# Patient Record
(demographics unavailable — no encounter records)

---

## 2024-11-27 NOTE — HISTORY OF PRESENT ILLNESS
[FreeTextEntry1] : 67 year RH female patient with a new onset of L hand tremor starting in January 2020. Pt denies the tremor worsening since it began. Pt states she also started to notice stiffness in movement and decreased dexterity of her fingers. Pt has a Cristhian scan on July 7th 2020 which indicated decreased uptake in R and L putamen (more on R side). Pt was diagnosed by Dr. Cliff Harris with PD in 08/2020. Pt was started on Sinemet 10- 100 mg since July 2020; she states she notices a minimal improvement of hand tremor with Sinemet. Pt experiences s/e of nausea with Sinemet.   Since last visit, patient has lost her mother to lung cancer which has been extremely difficult. 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 Her anxiety and grief have increased substantially after her mother passing. Due to this her PD motor and non-motor symptoms have worsened during this time of grieving.    Motor Function:   Ambulation Steady without assistive device without dizziness or lightheadedness, denies falls, FOG, or start hesitation. Tremor more so in left hand but not bothersome Dyskinesia Denies Hand Dexterity intact and able to button shirt buttons and type using cellphone keyboard  Exercise decreased with loss of mother recently to Lung cancer  Non-Motor Function:   ADLS Independent IADLS Independent On Time typically takes 30 mins to 60 mins for pill to kick in  Off Time 3 hrs after medication  Phonation No concerns  Sialorrhea No concerns Dysphagia to S/L Presyncope related to orthostasis BM every 1-2 days Sleep very fragmented and poor given circumstances Daytime Somnolence Mood See HPI Cognition at baseline  PT/OT/SLT None at present  Medications: Stalevo 100 TID at 8 am, 1 pm, 6 pm Sinemet ER 25/100 mg QHS at 10 pm (miss it occasionally)  Azilect 1 mg QAM Xanax 0.5 mg PRN sometimes not often  Clonazepam 0.5mg QHS  Levothyroxine 75 mcg and 1 x a week 50 mcg Oxybutynin ER 10 mg Alendronate 35 mg Linzess Livalo 4 mg Pantoprazole 20 mg Vit D Vit B Fish oil

## 2024-11-27 NOTE — PHYSICAL EXAM
[FreeTextEntry1] : Patient with normal cognitive function, mild hypomimia with diminished blinking, mild reduction of voice volume but no significant dysarthria.\par  There is mild rigidity with some cogwheeling, present bilaterally, more evident on the left side.\par  Mild intermittent resting tremor is present in the left hand, and occasionally in the right.\par  There is mild loss of motor dexterity, with decrement at rapid sequential and rapid alternating movements, also slightly more evident on the left side.\par  Foot tapping is slightly impaired, also with the left side more affected.\par  Posture is mildly stooped, with some shortening of gait stride and mild asymmetry with left reduction, and decrease arm swing, more evident on the left side.No trouble standing from a seated position with arms crossed. No trouble with turns, no freezing of gait. \par  Postural reflexes are normal.

## 2024-11-27 NOTE — END OF VISIT
[] : Resident [FreeTextEntry3] : Resident present and participated to visit. History, examination, and assessment and plan discussed with resident. [Time Spent: ___ minutes] : I have spent [unfilled] minutes of time on the encounter which excludes teaching and separately reported services.

## 2024-11-27 NOTE — ASSESSMENT
[FreeTextEntry1] : 66 yo RH woman with Parkinson's disease, Cristhian confirmed, with symptoms since late 2019 - early 2020, and with prominent tremor in the left hand. She has been started on Sinemet 25/100 bid with some benefit and some nausea. Started at last visit on Stalevo 100 tid.  Suffering greatly with the loss of her mother to Lung cancer. Her PD has not progressed significantly on exam and interview today and a majority of the visit addressing psychosocial concerns and using motivational interviewing. At present will focus on helping to improve mood and sleep before further adjustments to Parkinson's meds.  Plan: - Start Mirtazapine 1/2 tablet for two weeks then 1 tablet QHS - Recommend starting Melatonin again at nighttime to help with sleep - Continue Linaclotide.  - Discussed importance of daily BM and preventing constipation for medication efficacy - Discussed the importance of walking and exercise.  Continue Medication: -Okay to continue to take Xanax 0.5mg as needed -Sinemet 25/100 CR at 10- 11 pm -Stalevo 100 TID -Azilect 1 mg QAM -F/U with Dr. Ramos in 6 months in [place]   Encouraged to increase exercise and physical activity and maintain an active social and intellectual life  More than 50% of the visit were dedicated to review of treatment, therapeutic plan, and prognosis, and patient was counseled on coping and physical and emotional wellbeing.  
[FreeTextEntry1] : 68 yo RH woman with Parkinson's disease, Cristhian confirmed, with symptoms since late 2019 - early 2020, and with prominent tremor in the left hand. She has been started on Sinemet 25/100 bid with some benefit and some nausea. Started at last visit on Stalevo 100 tid.  Suffering greatly with the loss of her mother to Lung cancer. Her PD has not progressed significantly on exam and interview today and a majority of the visit addressing psychosocial concerns and using motivational interviewing. At present will focus on helping to improve mood and sleep before further adjustments to Parkinson's meds.  Plan: - Start Mirtazapine 1/2 tablet for two weeks then 1 tablet QHS - Recommend starting Melatonin again at nighttime to help with sleep - Continue Linaclotide.  - Discussed importance of daily BM and preventing constipation for medication efficacy - Discussed the importance of walking and exercise.  Continue Medication: -Okay to continue to take Xanax 0.5mg as needed -Sinemet 25/100 CR at 10- 11 pm -Stalevo 100 TID -Azilect 1 mg QAM -F/U with Dr. Ramos in 6 months in [place]   Encouraged to increase exercise and physical activity and maintain an active social and intellectual life  More than 50% of the visit were dedicated to review of treatment, therapeutic plan, and prognosis, and patient was counseled on coping and physical and emotional wellbeing.  
No

## 2025-05-29 NOTE — ASSESSMENT
[FreeTextEntry1] : 68-year-old RH woman with Parkinson's disease, Cristhian confirmed, with symptoms since late 2019 - early 2020, and with prominent tremor in the left hand. She has been started on Sinemet 25/100 bid with some benefit and some nausea.   Overall, there is visual progression in PD. Pt is stable and is doing well. Acknowledged and encouraged patient to be physically active.   Plan: - PT referral Continue Medication: -Sinemet 25/100 CR at 10- 11 pm -Stalevo 100 TID -Azilect 1 mg QAM -Okay to take Xanax 0.5mg for dental appointment - [facility] F/U with ZHANG Chaney in about 3months -F/U with Dr. Ramos in about 6 months   Encouraged to increase exercise and physical activity and maintain an active social and intellectual life  More than 50% of the visit were dedicated to review of treatment, therapeutic plan, and prognosis, and patient was counseled on coping and physical and emotional wellbeing.

## 2025-05-29 NOTE — PHYSICAL EXAM
[FreeTextEntry1] : Patient with normal cognitive function, mild hypomimia with diminished blinking, mild reduction of voice volume but no significant dysarthria. There is mild rigidity with some cogwheeling, present bilaterally, more evident on the left side. Mild intermittent resting tremor is present in the left hand There is mild loss of motor dexterity, with decrement at rapid sequential and rapid alternating movements, also slightly more evident on the left side. Foot tapping is slightly impaired, also with the left side more affected. Posture is mildly stooped, with some shortening of gait stride and mild asymmetry with left reduction, and decrease arm swing, more evident on the left side.No trouble standing from a seated position with arms crossed. No trouble with turns, no freezing of gait.  Postural reflexes are normal.

## 2025-05-29 NOTE — HISTORY OF PRESENT ILLNESS
[FreeTextEntry1] : 68-year-old RH female patient with a new onset of L hand tremor starting in late 2019 - early 2020. Pt denies the tremor worsening since it began. Pt states she also started to notice stiffness in movement and decreased dexterity of her fingers. Pt has a Cristhian scan on July 7th 2020 which indicated decreased uptake in R and L putamen (more on R side). Pt was diagnosed by Dr. Cliff Harris with PD in 08/2020. Pt was started on Sinemet 10- 100 mg since July 2020; she states she notices a minimal improvement of hand tremor with Sinemet. Pt experienced s/e of nausea with Sinemet. She was switched to Stalevo 100 that she tolerated, with improvement of her function.  Last seen on 11/21/2024, presents for routine follow up. At this visit, pt states right hip pain was worsen when lying in bed, took tylenol for pain as per PCP advised. Saw Ortho specialist who recommended PT, no surgery at the moment. She feels better at this visit. Overall, no significant changes in PD.  Motor Function:   Ambulation Steady without assistive device without dizziness or lightheadedness, denies falls, FOG, or start hesitation. Tremor: left hand but not bothersome Dyskinesia Denies Hand Dexterity intact and able to button shirt buttons and type using cellphone keyboard  Exercise decreased with loss of mother recently to Lung cancer  Non-Motor Function:   ADLS Independent IADLS Independent On Time typically takes 30 mins to 60 mins for pill to kick in  Off Time 3 hrs after medication  Phonation No concerns  Sialorrhea No concerns Dysphagia to S/L Presyncope related to orthostasis BM every 1-2 days Sleep very fragmented and poor given circumstances Daytime Somnolence Cognition at baseline  Walking and other form of physical activity daily. PT  Medications: Stalevo 100 TID at 8 am, 1 pm, 6 pm Sinemet ER 25/100 mg QHS at 10 pm (miss it occasionally)  Azilect 1 mg QAM Mirtazapine 30mg QHS Xanax 0.5 mg PRN sometimes not often  Clonazepam 0.5mg QHS  Levothyroxine 75 mcg and 1 x a week 50 mcg Oxybutynin ER 10 mg Alendronate 35 mg Linzess Livalo 4 mg Pantoprazole 20 mg Vit D Vit B Fish oil